# Patient Record
Sex: FEMALE | Race: BLACK OR AFRICAN AMERICAN
[De-identification: names, ages, dates, MRNs, and addresses within clinical notes are randomized per-mention and may not be internally consistent; named-entity substitution may affect disease eponyms.]

---

## 2019-01-06 ENCOUNTER — HOSPITAL ENCOUNTER (INPATIENT)
Dept: HOSPITAL 35 - ER | Age: 29
LOS: 1 days | Discharge: HOME | DRG: 552 | End: 2019-01-07
Attending: HOSPITALIST | Admitting: HOSPITALIST
Payer: COMMERCIAL

## 2019-01-06 VITALS — HEIGHT: 60 IN | WEIGHT: 207 LBS | BODY MASS INDEX: 40.64 KG/M2

## 2019-01-06 VITALS — DIASTOLIC BLOOD PRESSURE: 82 MMHG | SYSTOLIC BLOOD PRESSURE: 147 MMHG

## 2019-01-06 VITALS — DIASTOLIC BLOOD PRESSURE: 72 MMHG | SYSTOLIC BLOOD PRESSURE: 121 MMHG

## 2019-01-06 VITALS — SYSTOLIC BLOOD PRESSURE: 159 MMHG | DIASTOLIC BLOOD PRESSURE: 64 MMHG

## 2019-01-06 VITALS — DIASTOLIC BLOOD PRESSURE: 72 MMHG | SYSTOLIC BLOOD PRESSURE: 135 MMHG

## 2019-01-06 VITALS — SYSTOLIC BLOOD PRESSURE: 121 MMHG | DIASTOLIC BLOOD PRESSURE: 72 MMHG

## 2019-01-06 VITALS — DIASTOLIC BLOOD PRESSURE: 75 MMHG | SYSTOLIC BLOOD PRESSURE: 126 MMHG

## 2019-01-06 DIAGNOSIS — R41.3: ICD-10-CM

## 2019-01-06 DIAGNOSIS — R53.83: ICD-10-CM

## 2019-01-06 DIAGNOSIS — Z88.8: ICD-10-CM

## 2019-01-06 DIAGNOSIS — Z87.891: ICD-10-CM

## 2019-01-06 DIAGNOSIS — F32.9: ICD-10-CM

## 2019-01-06 DIAGNOSIS — I10: ICD-10-CM

## 2019-01-06 DIAGNOSIS — Z88.6: ICD-10-CM

## 2019-01-06 DIAGNOSIS — Z79.899: ICD-10-CM

## 2019-01-06 DIAGNOSIS — F41.9: ICD-10-CM

## 2019-01-06 DIAGNOSIS — M54.2: ICD-10-CM

## 2019-01-06 DIAGNOSIS — R53.1: ICD-10-CM

## 2019-01-06 DIAGNOSIS — Z90.721: ICD-10-CM

## 2019-01-06 DIAGNOSIS — G47.00: ICD-10-CM

## 2019-01-06 DIAGNOSIS — M54.9: Primary | ICD-10-CM

## 2019-01-06 DIAGNOSIS — Z91.048: ICD-10-CM

## 2019-01-06 DIAGNOSIS — Z90.79: ICD-10-CM

## 2019-01-06 DIAGNOSIS — Z98.891: ICD-10-CM

## 2019-01-06 LAB
ALBUMIN SERPL-MCNC: 3.3 G/DL (ref 3.4–5)
ALT SERPL-CCNC: 25 U/L (ref 30–65)
ANION GAP SERPL CALC-SCNC: 12 MMOL/L (ref 7–16)
AST SERPL-CCNC: 34 U/L (ref 15–37)
BASOPHILS NFR BLD AUTO: 0.6 % (ref 0–2)
BILIRUB SERPL-MCNC: 0.3 MG/DL
BUN SERPL-MCNC: 6 MG/DL (ref 7–18)
CALCIUM SERPL-MCNC: 9.2 MG/DL (ref 8.5–10.1)
CHLORIDE SERPL-SCNC: 103 MMOL/L (ref 98–107)
CO2 SERPL-SCNC: 22 MMOL/L (ref 21–32)
CREAT SERPL-MCNC: 0.8 MG/DL (ref 0.6–1)
EOSINOPHIL NFR BLD: 0 % (ref 0–3)
ERYTHROCYTE [DISTWIDTH] IN BLOOD BY AUTOMATED COUNT: 13.8 % (ref 10.5–14.5)
GLUCOSE SERPL-MCNC: 149 MG/DL (ref 74–106)
GRANULOCYTES NFR BLD MANUAL: 90.1 % (ref 36–66)
HCT VFR BLD CALC: 37.2 % (ref 37–47)
HGB BLD-MCNC: 12.5 GM/DL (ref 12–15)
LYMPHOCYTES NFR BLD AUTO: 8.4 % (ref 24–44)
MCH RBC QN AUTO: 30.7 PG (ref 26–34)
MCHC RBC AUTO-ENTMCNC: 33.6 G/DL (ref 28–37)
MCV RBC: 91.3 FL (ref 80–100)
MONOCYTES NFR BLD: 0.9 % (ref 1–8)
NEUTROPHILS # BLD: 7.8 THOU/UL (ref 1.4–8.2)
PLATELET # BLD: 309 THOU/UL (ref 150–400)
POTASSIUM SERPL-SCNC: 3.9 MMOL/L (ref 3.5–5.1)
PROT SERPL-MCNC: 7.8 G/DL (ref 6.4–8.2)
RBC # BLD AUTO: 4.07 MIL/UL (ref 4.2–5)
SODIUM SERPL-SCNC: 137 MMOL/L (ref 136–145)
WBC # BLD AUTO: 8.7 THOU/UL (ref 4–11)

## 2019-01-06 PROCEDURE — 10084: CPT

## 2019-01-06 NOTE — NUR
ASSESMENT COMPLETED. VSS. A/O. C/O PAIN. ORDERS RECEIVED FOR PAIN EMDS. NO
NOTED SOA. NO NV. PT RESTING IN BED APPEARS COMFORTABLE. NO CONCERNS VOICED.
WILL CONT. TO MONITOR.

## 2019-01-06 NOTE — NUR
Pt came up to unit from ED approx 0145. Pt able to ambulate SBA. Family in the
room. Pt a&o x4. Pt complains of pain 9/10 from back. Np on duty notified and
new order noted. Admission completed. Pain states she has hx of ms. Neurology
consulted. Pt states she has short term memory loss due to MS. Pt calls
appropriately. Will continue to monitor.

## 2019-01-07 VITALS — DIASTOLIC BLOOD PRESSURE: 76 MMHG | SYSTOLIC BLOOD PRESSURE: 136 MMHG

## 2019-01-07 VITALS — SYSTOLIC BLOOD PRESSURE: 125 MMHG | DIASTOLIC BLOOD PRESSURE: 64 MMHG

## 2019-01-07 LAB
ANION GAP SERPL CALC-SCNC: 12 MMOL/L (ref 7–16)
BUN SERPL-MCNC: 7 MG/DL (ref 7–18)
CALCIUM SERPL-MCNC: 8.6 MG/DL (ref 8.5–10.1)
CHLORIDE SERPL-SCNC: 106 MMOL/L (ref 98–107)
CO2 SERPL-SCNC: 22 MMOL/L (ref 21–32)
CREAT SERPL-MCNC: 0.7 MG/DL (ref 0.6–1)
GLUCOSE SERPL-MCNC: 129 MG/DL (ref 74–106)
POTASSIUM SERPL-SCNC: 3.8 MMOL/L (ref 3.5–5.1)
SODIUM SERPL-SCNC: 140 MMOL/L (ref 136–145)
TSH SERPL-ACNC: 0.37 UIU/ML (ref 0.36–3.74)
VIT B12 SERPL-MCNC: 412 PG/ML (ref 193–986)

## 2019-01-07 NOTE — NUR
PT IS UP AD [place]. WALKED UP AND DOWN THE [place] EARLIER IN THE SHIFT BEFORE
GOING TO BED. PT REQUESTED TO HAVE BEDTIME MEDS EARLY. BEEN ASLEEP. NO FURTHER
CONCERNS AT THIS TIME.

## 2019-01-07 NOTE — NUR
PT ADMITTED RELATED TO MS. CM REVIEWED CHART AND SPOKE WITH CARE TEAM. CM MET
WITH PT AT BEDSIDE THIS DAY. PT IS A&O X4. CM ROLE INTRODUED. PT INDICATED SHE
LIVES IN AN APARTMENT WITH HER FIANCE WITH 12 STEPS TO ENTER AND NO STEPS
INSIDE. PT INDICATED SHE HAD BEEN INDEPENDENT WITH GAIT AND ADLS PTA. PT
INDICATED HE PCP IS SHAZIA JIMENEZ AND THAT SHE HAS INSURANCE THROUGH
THE [employer] BUT THAT SHE NEEDS TO CALL THEM. PT INDICATED SHE PLANS TO
RETURN HOME ONCE MEDICLALY STABLE. CM TO FOLLOW AS INDICATED WITH DC PLANNING.

## 2019-01-07 NOTE — NUR
ASSESMENT COMPLETED. VSS. A/O. DENIES PAIN THIS AM. NO NOTED SOA. NO NV. PT
RESTING IN BED AT THIS TIME. NO CONCERNS VOICED. WILL CONT. TO MONITOR.

## 2019-01-07 NOTE — HC
[facility]
[address]
[place]   [address]                     CONSULTATION                  
_______________________________________________________________________________
 
Name:       BRYANT ORTIZ          Room #:         424-P       ADM IN  
M.R.#:      [id number]                       Account #:      [id number]  
Admission:  01/06/19    Attend Phys:    Pierre Roy MD     
Discharge:              Date of Birth:  [birth year]  
                                                          Report #: [id number]
                                                                    [id number]   
_______________________________________________________________________________
THIS REPORT FOR:   //name//                          
 
CC: Joe Molina
    Physician staff
 
DATE OF SERVICE:  01/06/2019
 
 
HISTORY OF PRESENT ILLNESS:  This is a 28-year-old female patient who is not
providing any straightforward and I cannot verify the history she is providing. 
Emergency room physician had called me last night that this patient claims that
she sees me in office for multiple sclerosis.  I looked into her records and I
cannot find any records in that regard.  She tells me that she was confused and
she saw me in 2012.  I went back to my office and I have records on this patient
since 2011 and I do not find any visit here.  Then, she tells me that she has
had an MRI done here to evaluate for MS, which showed multiple sclerosis.  I
cannot find any MRI in [facility] records of the brain.  She had an MRI of the
lumbar spine and thoracic spine here and that did not show much abnormality. 
Most of the visits to the [facility] outpatient have been for GI problems and
the back issues.  When asked what symptoms she was having in 2012, which helped
them establish the diagnosis of MS, she indicates she does not remember.  Very
unusual history is that she gets a steroid shot every few weeks and she believes
it is prednisone.  She got 1 yesterday and review of the records indicates that
she got 1 steroid shot in the emergency room here.  I had talked to the
emergency room physician and I told him that this is a very unusual treatment
she is getting and her symptoms are not typical, so they can admit her to the
hospital and I can see her tomorrow and try to pull out all her records.  They
did admit the patient, but from the record, it looks like for some reason, they
also gave her IV steroids.  The patient's symptoms are pretty unusual.  She said
she has anxiety, she has shakiness, she has some visual disturbance, she has
some headache, she has a lot of back pain, which is longstanding.  As mentioned
above, she said she got one IM shot from [facility] for the steroids and I
am noticing here in the ER note that she got another steroid shot in emergency
room here.  I assume they gave it for MS flare-up, but that was their decision
and during my discussion with them, plan was to admit her do the further workup.
In any event, the patient indicates she is doing about the same and her biggest
complaint appeared to be the back pain and shakiness.
 
REVIEW OF SYSTEMS:  Indicate she says she carries the diagnosis of MS.  Further
history is not clear in this patient at all.  I do not know how established the
diagnosis of multiple sclerosis is and there are multiple red flags in that
regard, both from symptom perspective, the history, which I cannot very far, and
very unusual dose of prednisone she is getting.  Her symptoms are pretty unusual
too, especially with the back pain, which is not typically the symptom for MS. 
 
 
 
[facility]
[address] [place]   [address]                     CONSULTATION                  
_______________________________________________________________________________
 
Name:       BRYANT ORTIZ WADE          Room #:         424-P       ADM IN  
M.R.#:      [id number]                       Account #:      [id number]  
Admission:  01/06/19    Attend Phys:    Pierre Roy MD     
Discharge:              Date of Birth:  [birth year]  
                                                          Report #: [id number]
                                                                    [id number]   
_______________________________________________________________________________
She has a lot of trouble from OB perspective including ectopic pregnancy,
ovarian cyst.  She says she has a history of hypertension, she takes medications
for that.  She also takes trazodone at night.  This was her relevant 14-point
review of system.
 
PAST MEDICAL HISTORY:  Positive for diagnosis of MS, but that whole question
needs to be readdressed.
 
FAMILY HISTORY:  Negative for any early age stroke.
 
SOCIAL HISTORY:  She said she does not drink alcohol except on special
occasions.
 
PHYSICAL EXAMINATION:
NEUROLOGY:  Indicates she is alert.  She is responsive.  She can follow simple
command.  She sometime hesitates in talking, but her speech, otherwise looks
unremarkable.  Cranial nerve examination 2-12 appears unremarkable.  I could not
have a very good look at the patient's fundus for some reason.  She moves all 4
extremities.  She takes a long time to tell me whether her toes up and down, but
she is ultimately able to tell.  Reflexes may be somewhat diminished on both
sides, but symmetrical.  There is no meningeal sign.  There is no carotid bruit.
 She is moderately built individual.  She does not have any dysmorphic features
of eyes, ears and face.
CARDIAC:  Appears unremarkable.
LUNGS:  No respiratory difficulty was noted.
VITAL SIGNS:  Blood pressure was 135/72, respirations 16, pulse is 84,
temperature is 98.
 
IMPRESSION:
1.  The whole question of multiple sclerosis needs to be addressed in this
patient.  I do not find any documentation of any established diagnosis of
multiple sclerosis of all the records I reviewed.  Rather, I cannot find any MRI
of the brain, which was done to even address that question.  I discussed that
with the patient and I told her that we need to pull out her prior MRIs and I
did and I cannot find any MRI of the brain, which suggests any multiple
sclerosis.  I will go ahead and order an MRI of the brain.  Her symptoms are
pretty unusual.  She already had an MRI of the lumbar spine and thoracic spine
at one time and that did not show any possibility of multiple sclerosis. 
Therefore, psychiatric issue needs to be addressed and sometime the pain can
come from the pelvic area if the patient had a lot of gynecological problem. 
With all the information I have, I will not suggest giving any more prednisone
to this patient.  I discussed with her that getting an intramuscular prednisone
every few weeks for multiple sclerosis is not the treatment for multiple
sclerosis and in fact until there is a strong indication, steroids on a regular
basis should not be given because of its potential side effects.  I will be also
doing some collagen vascular workup.  Her question of the back pain needs to be
 
 
 
[facility]
[address]
[place]   [address]                     CONSULTATION                  
_______________________________________________________________________________
 
Name:       BRYANT ORTIZ          Room #:         424-P       [facility] [place]  
..#:      [id number]                       Account #:      [id number]  
Admission:  01/06/19    Attend Phys:    Pierre Roy MD     
Discharge:              Date of Birth:  [birth year]  
                                                          Report #: [id number]
                                                                    [id number]   
_______________________________________________________________________________
addressed separately because of back pain is not a common feature or at least a
common presenting feature for multiple sclerosis.  All of it was discussed with
the patient in detail and as mentioned above, I do not find any evidence where
she has an established diagnosis of multiple sclerosis and we should work up
this patient, but will not initiate any treatment for multiple sclerosis and I
will suggest not giving her steroids.  As mentioned above, she did get a dose of
steroid from emergency room physician last night and I assume that was given
because the patient has given a history of multiple sclerosis to them and was
claiming that she had a flare-up and they decided to give that a dose, but that
was not my recommendation.  I do not believe we need to continue because she
basically got a single dose here and she did have another dose and there is no
need to taper off the steroids and we can just not give any further because it
is unlikely she will have withdrawal from that.
 
RECOMMENDATIONS:
1.  I will get an MRI of the brain.
2.  I will send some collagen vascular workup.
3.  I will check Lyme disease antibodies.
4.  We will hold any further steroids till we can get the workup back.  This is
because I have extensively reviewed her records and I have not found any
documentation that this patient ever has an established diagnosis of MS.  The
patient claims that she has MS and has a regular flare-up, but this morning, I
have reviewed all the records in the computer since 2011 and there is no
documentation that this patient has MS and because of that, I do not think we
should continue steroids till we can establish the diagnosis.  We will basically
await the MRI and decide about further workup.  Her back pain is not related to
her MS and as mentioned above, I am not certain about the diagnosis.  Another
etiology should be found for that and the patient should be managed for that. 
She also appeared to have psychiatric issues and a psychiatric consult should be
considered for her.
 
Thank you very much for this referral.
 
 
 
 
 
 
 
 
 
 
 
 
  <ELECTRONICALLY SIGNED>
   By: Sergey Molina MD             
  01/07/19     1404
D: 01/06/19 1129                           _____________________________________
T: 01/07/19 0253                           Sergey Molina MD               /nt

## 2019-01-07 NOTE — NUR
DC INSTRUCTIONS GIVEN TO PT AND FAMILY. BOTH VERBALIZED UNDERSTANDING. PT DC'D
HOME IN STABLE CONDITION. SCRIPT FOR HYDROCODONE AND ATIVAN GIVEN.